# Patient Record
Sex: MALE | Race: OTHER | NOT HISPANIC OR LATINO
[De-identification: names, ages, dates, MRNs, and addresses within clinical notes are randomized per-mention and may not be internally consistent; named-entity substitution may affect disease eponyms.]

---

## 2019-02-05 PROBLEM — Z00.00 ENCOUNTER FOR PREVENTIVE HEALTH EXAMINATION: Status: ACTIVE | Noted: 2019-02-05

## 2019-02-15 ENCOUNTER — TRANSCRIPTION ENCOUNTER (OUTPATIENT)
Age: 39
End: 2019-02-15

## 2019-02-15 ENCOUNTER — APPOINTMENT (OUTPATIENT)
Dept: UROLOGY | Facility: CLINIC | Age: 39
End: 2019-02-15
Payer: COMMERCIAL

## 2019-02-15 VITALS
WEIGHT: 197 LBS | DIASTOLIC BLOOD PRESSURE: 78 MMHG | HEART RATE: 75 BPM | BODY MASS INDEX: 29.18 KG/M2 | HEIGHT: 69 IN | SYSTOLIC BLOOD PRESSURE: 122 MMHG

## 2019-02-15 DIAGNOSIS — I86.1 SCROTAL VARICES: ICD-10-CM

## 2019-02-15 DIAGNOSIS — Z80.3 FAMILY HISTORY OF MALIGNANT NEOPLASM OF BREAST: ICD-10-CM

## 2019-02-15 DIAGNOSIS — Z83.3 FAMILY HISTORY OF DIABETES MELLITUS: ICD-10-CM

## 2019-02-15 DIAGNOSIS — Z78.9 OTHER SPECIFIED HEALTH STATUS: ICD-10-CM

## 2019-02-15 PROCEDURE — 99204 OFFICE O/P NEW MOD 45 MIN: CPT

## 2019-02-15 NOTE — PHYSICAL EXAM
[General Appearance - Well Developed] : well developed [General Appearance - Well Nourished] : well nourished [Normal Appearance] : normal appearance [Well Groomed] : well groomed [General Appearance - In No Acute Distress] : no acute distress [Heart Rate And Rhythm] : Heart rate and rhythm were normal [Edema] : no peripheral edema [Respiration, Rhythm And Depth] : normal respiratory rhythm and effort [Exaggerated Use Of Accessory Muscles For Inspiration] : no accessory muscle use [Abdomen Soft] : soft [Abdomen Tenderness] : non-tender [Costovertebral Angle Tenderness] : no ~M costovertebral angle tenderness [Urethral Meatus] : meatus normal [Testes Mass (___cm)] : there were no testicular masses [Normal Station and Gait] : the gait and station were normal for the patient's age [Skin Color & Pigmentation] : normal skin color and pigmentation [] : no rash [No Focal Deficits] : no focal deficits [Oriented To Time, Place, And Person] : oriented to person, place, and time [Affect] : the affect was normal [Mood] : the mood was normal [Not Anxious] : not anxious [No Palpable Adenopathy] : no palpable adenopathy [FreeTextEntry1] : no palpable varicoceles, bilat vas deferens palpated, large pad pain around penis/scrotum

## 2019-02-15 NOTE — HISTORY OF PRESENT ILLNESS
[FreeTextEntry1] : This is a 38 year male who has had right side scrotal swelling for about 10 years. Also had similar episode 20 years ago.  states that minimal pain but swelling worse before and after sex.  no injury to scrotal. no scrotal surgery. \par \par no other medical problems. no pain with valsalva\par no pain with urination or blood in the urine\par has not had any imaging\par \par has been trying to have a baby for the last 10 years\par partner is 27 year old. use no condoms or birth control or lubricants. He ejaculates in the vaginal. \par neither partner has even had a pregnancy with another partner. \par sex drive and energy are normal. \par normal body hair distribution. \par on PE has large pad pad in the pubic area which makes the physical exam difficult but both vas deferens are present.

## 2019-02-23 ENCOUNTER — LABORATORY RESULT (OUTPATIENT)
Age: 39
End: 2019-02-23

## 2019-02-23 ENCOUNTER — OUTPATIENT (OUTPATIENT)
Dept: OUTPATIENT SERVICES | Facility: HOSPITAL | Age: 39
LOS: 1 days | Discharge: HOME | End: 2019-02-23

## 2019-02-23 DIAGNOSIS — N46.9 MALE INFERTILITY, UNSPECIFIED: ICD-10-CM

## 2019-02-25 LAB
BASOPHILS # BLD AUTO: 0.02 K/UL
BASOPHILS NFR BLD AUTO: 0.4 %
CHOLEST SERPL-MCNC: 182 MG/DL
CHOLEST/HDLC SERPL: 2.8 RATIO
EOSINOPHIL # BLD AUTO: 0.1 K/UL
EOSINOPHIL NFR BLD AUTO: 1.8 %
FSH SERPL-MCNC: 2.2 IU/L
HCT VFR BLD CALC: 44.3 %
HDLC SERPL-MCNC: 64 MG/DL
HGB BLD-MCNC: 14.6 G/DL
IMM GRANULOCYTES NFR BLD AUTO: 0.2 %
LDLC SERPL CALC-MCNC: 117 MG/DL
LH SERPL-ACNC: 4.6 IU/L
LYMPHOCYTES # BLD AUTO: 2.2 K/UL
LYMPHOCYTES NFR BLD AUTO: 38.8 %
MAN DIFF?: NORMAL
MCHC RBC-ENTMCNC: 26.6 PG
MCHC RBC-ENTMCNC: 33 G/DL
MCV RBC AUTO: 80.7 FL
MONOCYTES # BLD AUTO: 0.44 K/UL
MONOCYTES NFR BLD AUTO: 7.8 %
NEUTROPHILS # BLD AUTO: 2.9 K/UL
NEUTROPHILS NFR BLD AUTO: 51 %
PLATELET # BLD AUTO: 193 K/UL
RBC # BLD: 5.49 M/UL
RBC # FLD: 18.6 %
T4 SERPL-MCNC: 7.7 UG/DL
TRIGL SERPL-MCNC: 61 MG/DL
TSH SERPL-ACNC: 1.54 UIU/ML
WBC # FLD AUTO: 5.67 K/UL

## 2019-02-26 LAB — ESTROGEN SERPL-MCNC: 113 PG/ML

## 2019-02-28 LAB
TESTOST BND SERPL-MCNC: 6.3 PG/ML
TESTOST BND SERPL-MCNC: 9.36 NG/DL
TESTOST SERPL-MCNC: 668.4 NG/DL
TESTOSTERONE BIOAVAILABLE: 78 NG/DL
TESTOSTERONE TOTAL S: 520 NG/DL

## 2019-04-08 ENCOUNTER — TRANSCRIPTION ENCOUNTER (OUTPATIENT)
Age: 39
End: 2019-04-08

## 2019-04-29 ENCOUNTER — APPOINTMENT (OUTPATIENT)
Dept: UROLOGY | Facility: CLINIC | Age: 39
End: 2019-04-29
Payer: SELF-PAY

## 2019-04-29 VITALS
HEART RATE: 69 BPM | WEIGHT: 197 LBS | BODY MASS INDEX: 29.18 KG/M2 | DIASTOLIC BLOOD PRESSURE: 80 MMHG | SYSTOLIC BLOOD PRESSURE: 119 MMHG | HEIGHT: 69 IN

## 2019-04-29 DIAGNOSIS — K40.90 UNILATERAL INGUINAL HERNIA, W/OUT OBSTRUCTION OR GANGRENE, NOT SPECIFIED AS RECURRENT: ICD-10-CM

## 2019-04-29 DIAGNOSIS — N46.9 MALE INFERTILITY, UNSPECIFIED: ICD-10-CM

## 2019-04-29 PROCEDURE — 99214 OFFICE O/P EST MOD 30 MIN: CPT

## 2019-04-29 NOTE — LETTER BODY
[Dear  ___] : Dear  [unfilled], [Courtesy Letter:] : I had the pleasure of seeing your patient, [unfilled], in my office today. [Please see my note below.] : Please see my note below. [Sincerely,] : Sincerely, [FreeTextEntry2] : Amy Miller M.D.\par [address] [address]\par

## 2019-04-29 NOTE — ASSESSMENT
[FreeTextEntry1] : My physical exam shows a significant left inguinal hernia, his testicles are acceptable in size and consistency 22 on the left 30 on the right using the Prader orchidometer with no evidence of a varicocele.\par \par As a totality I find his exam and parameters within acceptable limits except for a large left inguinal hernia for which he should see a general surgeon. His wife needs to see a report endocrinologist to see what’s going on with her and if everything is okay they will need to consider ART for on explained infertility.\par \par

## 2019-04-29 NOTE — HISTORY OF PRESENT ILLNESS
[Currently Experiencing ___] :  [unfilled] [FreeTextEntry1] : Som came in with his wife Azucena for a subspecialty opinion. They had seen Dr. Briceño on February 15 with a history of right-sided scrotal swelling 10 and then previously 20 years ago without pain the swelling was worse before and after sex and they have been together for 10 years without birth control without conception. He found the exam difficult due to increased amounts of fat in the suprapubic area, the patient had been much area lost a lot of weight but still had some excess fat in the suprapubic area, sent her for an ultrasound which unfortunately was only done with him supine a semen analysis which was supposed to be done times two in a hormone panel.

## 2019-04-29 NOTE — PHYSICAL EXAM
[Abdomen Soft] : soft [Penis Abnormality] : normal circumcised penis [Scrotum] : the scrotum was normal [Testes Tenderness] : no tenderness of the testes [Testes Mass (___cm)] : there were no testicular masses [FreeTextEntry1] : right epidydimal head lump, no varicocele, left = 22 right = +/- 30 ml normal consistency [] : no respiratory distress [Respiration, Rhythm And Depth] : normal respiratory rhythm and effort [Exaggerated Use Of Accessory Muscles For Inspiration] : no accessory muscle use [Oriented To Time, Place, And Person] : oriented to person, place, and time [Affect] : the affect was normal [Mood] : the mood was normal [Not Anxious] : not anxious [Normal Station and Gait] : the gait and station were normal for the patient's age

## 2021-03-17 NOTE — LETTER HEADER
Detail Level: Detailed [FreeTextEntry3] : Sunita Nicholas M.D.\par Director of Urology\par Director of Male Infertility\par [facility]\par [address] [place] [address], [facility] [address]\[place] [address]

## 2023-11-28 ENCOUNTER — APPOINTMENT (OUTPATIENT)
Dept: OTOLARYNGOLOGY | Facility: CLINIC | Age: 43
End: 2023-11-28
Payer: MEDICAID

## 2023-11-28 DIAGNOSIS — K13.70 UNSPECIFIED LESIONS OF ORAL MUCOSA: ICD-10-CM

## 2023-11-28 PROCEDURE — 99203 OFFICE O/P NEW LOW 30 MIN: CPT

## 2023-11-29 RX ORDER — TRIAMCINOLONE ACETONIDE 1 MG/G
0.1 PASTE DENTAL 3 TIMES DAILY
Qty: 5 | Refills: 2 | Status: ACTIVE | COMMUNITY
Start: 2023-11-28 | End: 1900-01-01

## 2024-01-12 ENCOUNTER — NON-APPOINTMENT (OUTPATIENT)
Age: 44
End: 2024-01-12

## 2024-03-03 ENCOUNTER — NON-APPOINTMENT (OUTPATIENT)
Age: 44
End: 2024-03-03

## 2025-07-29 ENCOUNTER — NON-APPOINTMENT (OUTPATIENT)
Age: 45
End: 2025-07-29